# Patient Record
(demographics unavailable — no encounter records)

---

## 2025-07-17 NOTE — HISTORY OF PRESENT ILLNESS
[FreeTextEntry1] :  57yo here for annual gyn. Increased her estrogen pills less than a week ago, so not much of a change with hot flashes, will give it more time and reassess. Estradiol vaginal cream helps with dryness. Pt without complaints.   hysterectomy/bso at age 35 -> went into menopause after - had many hot flashes. Took HRT for the hot flashes -  no history of abnormal paps - vaginal pap done 2 years ago wnl vaginal x1, c/s x2  PMHx: low back fusion, kidney stones PSHx: bowel resection for obstruction 14 years ago, appendix NKDA Meds: antidepressant, oxycodone prn low back pain, sleep -> melatonin, ambien, remeron  Mammogram/sono :  UTD Colonoscopy -> due  bone density UTD

## 2025-07-17 NOTE — DISCUSSION/SUMMARY
[FreeTextEntry1] : Annual exam in patient with symptomatic menopause  Normal examination.  f/u in a month to discuss menopause symptoms on increased dose of estrogen pill Follow up 1 year or PRN RX estrogen cream

## 2025-07-17 NOTE — PHYSICAL EXAM
[Chaperoned Physical Exam] : A chaperone was present in the examining room during all aspects of the physical examination. [Normal] : Anus and perineum: Normal sphincter tone, no masses, no prolapse. [MA] : MA [FreeTextEntry2] : Soumya [Absent] : Adnexa(ae): Absent [Fully active, able to carry on all pre-disease performance without restriction] : Status 0 - Fully active, able to carry on all pre-disease performance without restriction